# Patient Record
Sex: MALE | Race: WHITE | NOT HISPANIC OR LATINO
[De-identification: names, ages, dates, MRNs, and addresses within clinical notes are randomized per-mention and may not be internally consistent; named-entity substitution may affect disease eponyms.]

---

## 2018-07-27 ENCOUNTER — TRANSCRIPTION ENCOUNTER (OUTPATIENT)
Age: 63
End: 2018-07-27

## 2019-04-23 ENCOUNTER — OUTPATIENT (OUTPATIENT)
Dept: OUTPATIENT SERVICES | Facility: HOSPITAL | Age: 64
LOS: 1 days | End: 2019-04-23
Payer: MEDICARE

## 2019-04-23 ENCOUNTER — APPOINTMENT (OUTPATIENT)
Dept: ANESTHESIOLOGY | Facility: CLINIC | Age: 64
End: 2019-04-23

## 2019-04-23 DIAGNOSIS — M54.12 RADICULOPATHY, CERVICAL REGION: ICD-10-CM

## 2019-04-23 PROCEDURE — 62321 NJX INTERLAMINAR CRV/THRC: CPT

## 2021-11-16 ENCOUNTER — APPOINTMENT (OUTPATIENT)
Dept: PULMONOLOGY | Facility: CLINIC | Age: 66
End: 2021-11-16
Payer: MEDICARE

## 2021-11-16 VITALS
HEIGHT: 66 IN | HEART RATE: 63 BPM | TEMPERATURE: 98.2 F | WEIGHT: 195 LBS | SYSTOLIC BLOOD PRESSURE: 124 MMHG | DIASTOLIC BLOOD PRESSURE: 80 MMHG | OXYGEN SATURATION: 97 % | BODY MASS INDEX: 31.34 KG/M2

## 2021-11-16 DIAGNOSIS — Z92.29 PERSONAL HISTORY OF OTHER DRUG THERAPY: ICD-10-CM

## 2021-11-16 DIAGNOSIS — Z87.891 PERSONAL HISTORY OF NICOTINE DEPENDENCE: ICD-10-CM
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
## 2021-11-16 PROCEDURE — 99203 OFFICE O/P NEW LOW 30 MIN: CPT

## 2021-11-19 LAB — SARS-COV-2 N GENE NPH QL NAA+PROBE: NOT DETECTED

## 2021-11-23 ENCOUNTER — APPOINTMENT (OUTPATIENT)
Dept: PULMONOLOGY | Facility: CLINIC | Age: 66
End: 2021-11-23
Payer: MEDICARE

## 2021-11-23 VITALS
WEIGHT: 195 LBS | HEART RATE: 74 BPM | DIASTOLIC BLOOD PRESSURE: 82 MMHG | BODY MASS INDEX: 31.47 KG/M2 | TEMPERATURE: 97.5 F | SYSTOLIC BLOOD PRESSURE: 128 MMHG | OXYGEN SATURATION: 96 %

## 2021-11-23 PROCEDURE — 99213 OFFICE O/P EST LOW 20 MIN: CPT | Mod: 25

## 2021-11-23 PROCEDURE — 94060 EVALUATION OF WHEEZING: CPT

## 2021-11-23 PROCEDURE — 94729 DIFFUSING CAPACITY: CPT

## 2021-11-23 PROCEDURE — 94727 GAS DIL/WSHOT DETER LNG VOL: CPT

## 2021-11-26 NOTE — DISCUSSION/SUMMARY
[FreeTextEntry1] : 65 yo male with abnormal chest CT with essentially normal PFT despite known chest CT abnormalities. I reviewed the PFT results with the patient. Given lack of pulmonary complaints, there is no need for any treatment at present. A repeat chest CT will be performed in one year. He is to follow up with his PMD as before.

## 2021-11-26 NOTE — HISTORY OF PRESENT ILLNESS
[< 30 pack-years] : < 30 pack-years [Former] : former [TextBox_4] : 65 yo male presents for evaluation of abnormal chest CT findings on routine screening. The patient has a greater than 20 pack year history of smoking, having stopped five years ago but vapped after. He denies cough or SOB. 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 [TextBox_11] : 1 [TextBox_13] : 20 [YearQuit] : 2016 [TextBox_29] : Denies snoring, daytime somnolence, apneic episodes, AM headaches

## 2021-11-26 NOTE — DISCUSSION/SUMMARY
[FreeTextEntry1] : 65 yo male with prior smoking and work related dust exposure, with abnormal chest CT findings on screening study. I discussed the findings with the patient. PFT with diffusion will be performed after  covid 19 swab as mandated. A repeat chest CT will be performed in one year. He is to follow up with his PMD as before.

## 2021-11-26 NOTE — HISTORY OF PRESENT ILLNESS
[Former] : former [< 30 pack-years] : < 30 pack-years [TextBox_4] : 65 yo male with abnormal chest CT and prior smoking history, presents for follow up and PFT. The patient is "OK" without cough, chest pain or SOB.  [TextBox_11] : 1 [TextBox_13] : 20 [YearQuit] : 2016 [TextBox_29] : Denies snoring, daytime somnolence, apneic episodes, AM headaches

## 2022-11-09 ENCOUNTER — OFFICE (OUTPATIENT)
Dept: URBAN - METROPOLITAN AREA CLINIC 27 | Facility: CLINIC | Age: 67
Setting detail: OPHTHALMOLOGY
End: 2022-11-09
Payer: MEDICARE

## 2022-11-09 DIAGNOSIS — H53.40: ICD-10-CM

## 2022-11-09 DIAGNOSIS — H02.834: ICD-10-CM

## 2022-11-09 DIAGNOSIS — H02.831: ICD-10-CM

## 2022-11-09 PROBLEM — H52.7 REFRACTIVE ERROR: Status: ACTIVE | Noted: 2022-11-09

## 2022-11-09 PROBLEM — H57.813 BROW PTOSIS; BOTH EYES: Status: ACTIVE | Noted: 2022-11-09

## 2022-11-09 PROCEDURE — 92285 EXTERNAL OCULAR PHOTOGRAPHY: CPT | Performed by: OPHTHALMOLOGY

## 2022-11-09 PROCEDURE — 92081 LIMITED VISUAL FIELD XM: CPT | Performed by: OPHTHALMOLOGY

## 2022-11-09 PROCEDURE — 99214 OFFICE O/P EST MOD 30 MIN: CPT | Performed by: OPHTHALMOLOGY

## 2022-11-09 ASSESSMENT — KERATOMETRY
OS_K2POWER_DIOPTERS: 43.00
OD_K2POWER_DIOPTERS: 42.50
METHOD_AUTO_MANUAL: AUTO
OS_K1POWER_DIOPTERS: 41.25
OD_AXISANGLE_DEGREES: 014
OS_AXISANGLE_DEGREES: 173
OD_K1POWER_DIOPTERS: 41.50

## 2022-11-09 ASSESSMENT — REFRACTION_AUTOREFRACTION
OD_AXIS: 014
OS_SPHERE: -1.50
OD_SPHERE: -0.75
OS_AXIS: 170
OD_CYLINDER: +2.00
OS_CYLINDER: +2.75

## 2022-11-09 ASSESSMENT — AXIALLENGTH_DERIVED
OD_AL: 24.0544
OS_AL: 24.159

## 2022-11-09 ASSESSMENT — SPHEQUIV_DERIVED
OS_SPHEQUIV: -0.125
OD_SPHEQUIV: 0.25

## 2022-11-09 ASSESSMENT — CONFRONTATIONAL VISUAL FIELD TEST (CVF)
OD_FINDINGS: FULL
OS_FINDINGS: FULL

## 2022-11-09 ASSESSMENT — VISUAL ACUITY
OS_BCVA: 20/30-2
OD_BCVA: 20/40+2

## 2022-11-09 ASSESSMENT — LID POSITION - DERMATOCHALASIS
OD_DERMATOCHALASIS: RUL
OS_DERMATOCHALASIS: LUL

## 2022-12-20 ENCOUNTER — APPOINTMENT (OUTPATIENT)
Dept: PULMONOLOGY | Facility: CLINIC | Age: 67
End: 2022-12-20

## 2022-12-20 VITALS
TEMPERATURE: 96.4 F | OXYGEN SATURATION: 98 % | HEART RATE: 78 BPM | SYSTOLIC BLOOD PRESSURE: 124 MMHG | DIASTOLIC BLOOD PRESSURE: 68 MMHG

## 2022-12-20 DIAGNOSIS — J84.9 INTERSTITIAL PULMONARY DISEASE, UNSPECIFIED: ICD-10-CM

## 2022-12-20 PROCEDURE — 99214 OFFICE O/P EST MOD 30 MIN: CPT

## 2022-12-20 NOTE — DISCUSSION/SUMMARY
[FreeTextEntry1] : 68 yo male with significant smoking without pulmonary complaints. Cessation of all types of smoking was stressed. I reviewed the chest CT images on line and discussed the findings with the patient. A repeat study will be performed in one year. He is to follow up with his PMD as before.

## 2022-12-20 NOTE — HISTORY OF PRESENT ILLNESS
[Former] : former [>= 20 pack years] : >= 20 pack years [Current] : current [TextBox_4] : 68 yo male with long history of smoking and abnormal chest CT, presents for follow up. The patient claims to have stopped smoking two months ago after return from [facility]. He uses E cigarettes PRN however. He denies cough or SOB . A repeat chest CT was performed earlier this month. [TextBox_11] : 1 [TextBox_13] : 21 [YearQuit] : 2022 [TextBox_29] : Denies snoring, daytime somnolence, apneic episodes, AM headaches

## 2023-01-03 ENCOUNTER — APPOINTMENT (OUTPATIENT)
Dept: PULMONOLOGY | Facility: CLINIC | Age: 68
End: 2023-01-03
Payer: MEDICARE

## 2023-01-03 VITALS
RESPIRATION RATE: 18 BRPM | DIASTOLIC BLOOD PRESSURE: 80 MMHG | TEMPERATURE: 96.8 F | SYSTOLIC BLOOD PRESSURE: 134 MMHG | HEART RATE: 64 BPM | OXYGEN SATURATION: 98 %

## 2023-01-03 DIAGNOSIS — Z76.89 PERSONS ENCOUNTERING HEALTH SERVICES IN OTHER SPECIFIED CIRCUMSTANCES: ICD-10-CM

## 2023-01-03 DIAGNOSIS — R93.89 ABNORMAL FINDINGS ON DIAGNOSTIC IMAGING OF OTHER SPECIFIED BODY STRUCTURES: ICD-10-CM

## 2023-01-03 PROCEDURE — 94727 GAS DIL/WSHOT DETER LNG VOL: CPT

## 2023-01-03 PROCEDURE — 94729 DIFFUSING CAPACITY: CPT

## 2023-01-03 PROCEDURE — 99214 OFFICE O/P EST MOD 30 MIN: CPT | Mod: 25

## 2023-01-03 PROCEDURE — 94060 EVALUATION OF WHEEZING: CPT

## 2023-01-04 PROBLEM — R93.89 ABNORMAL CHEST CT: Status: ACTIVE | Noted: 2021-11-16

## 2023-01-04 PROBLEM — Z76.89 ENCOUNTER FOR EVALUATION OF COPD: Status: ACTIVE | Noted: 2023-01-04

## 2023-01-04 NOTE — HISTORY OF PRESENT ILLNESS
[Former] : former [>= 20 pack years] : >= 20 pack years [Current] : current [TextBox_4] : 68 yo male with long history of smoking and abnormal chest CT, presents for follow up and for PFT. The patient continues to abstain  from smoking for over three months. He uses E cigarettes PRN however. He denies cough or SOB . He feels "good". [TextBox_11] : 1 [TextBox_13] : 21 [YearQuit] : 2022 [TextBox_29] : Denies snoring, daytime somnolence, apneic episodes, AM headaches

## 2023-01-04 NOTE — DISCUSSION/SUMMARY
[FreeTextEntry1] : 68 yo male with stable pulmonary exam. I reviewed the PFT results with the patient as well as the chest CT results again. He is to continue to avoid cigarette use and stop E cigarette use. A repeat chest CT will be performed in one year. He is to follow up with his PMD as before.

## 2023-01-11 ENCOUNTER — OFFICE (OUTPATIENT)
Dept: URBAN - METROPOLITAN AREA CLINIC 27 | Facility: CLINIC | Age: 68
Setting detail: OPHTHALMOLOGY
End: 2023-01-11
Payer: MEDICARE

## 2023-01-11 DIAGNOSIS — H02.834: ICD-10-CM

## 2023-01-11 DIAGNOSIS — H04.123: ICD-10-CM

## 2023-01-11 DIAGNOSIS — H53.40: ICD-10-CM

## 2023-01-11 DIAGNOSIS — H04.122: ICD-10-CM

## 2023-01-11 DIAGNOSIS — H04.121: ICD-10-CM

## 2023-01-11 DIAGNOSIS — H57.813: ICD-10-CM

## 2023-01-11 DIAGNOSIS — H02.831: ICD-10-CM

## 2023-01-11 PROCEDURE — 99213 OFFICE O/P EST LOW 20 MIN: CPT | Performed by: OPHTHALMOLOGY

## 2023-01-11 PROCEDURE — 83861 MICROFLUID ANALY TEARS: CPT | Performed by: OPHTHALMOLOGY

## 2023-01-11 ASSESSMENT — AXIALLENGTH_DERIVED
OD_AL: 24.0544
OS_AL: 24.159

## 2023-01-11 ASSESSMENT — KERATOMETRY
OS_K1POWER_DIOPTERS: 41.25
OD_AXISANGLE_DEGREES: 014
OD_K2POWER_DIOPTERS: 42.50
OS_K2POWER_DIOPTERS: 43.00
OD_K1POWER_DIOPTERS: 41.50
OS_AXISANGLE_DEGREES: 173
METHOD_AUTO_MANUAL: AUTO

## 2023-01-11 ASSESSMENT — LID POSITION - DERMATOCHALASIS
OD_DERMATOCHALASIS: RUL
OS_DERMATOCHALASIS: LUL

## 2023-01-11 ASSESSMENT — REFRACTION_AUTOREFRACTION
OD_SPHERE: -0.75
OS_CYLINDER: +2.75
OS_SPHERE: -1.50
OS_AXIS: 170
OD_AXIS: 014
OD_CYLINDER: +2.00

## 2023-01-11 ASSESSMENT — SPHEQUIV_DERIVED
OS_SPHEQUIV: -0.125
OD_SPHEQUIV: 0.25

## 2023-01-11 ASSESSMENT — TEAR BREAK UP TIME (TBUT)
OS_TBUT: 1+
OD_TBUT: 1+

## 2023-01-11 ASSESSMENT — VISUAL ACUITY
OD_BCVA: 20/30-2
OS_BCVA: 20/30+2

## 2023-01-11 ASSESSMENT — CONFRONTATIONAL VISUAL FIELD TEST (CVF)
OS_FINDINGS: FULL
OD_FINDINGS: FULL

## 2023-01-20 ENCOUNTER — RX ONLY (RX ONLY)
Age: 68
End: 2023-01-20

## 2023-01-20 ENCOUNTER — OFFICE (OUTPATIENT)
Dept: URBAN - METROPOLITAN AREA CLINIC 35 | Facility: CLINIC | Age: 68
Setting detail: OPHTHALMOLOGY
End: 2023-01-20
Payer: MEDICARE

## 2023-01-20 DIAGNOSIS — H02.834: ICD-10-CM

## 2023-01-20 DIAGNOSIS — H02.831: ICD-10-CM

## 2023-01-20 PROCEDURE — 15823 BLEPHARP UPR EYELID XCSV SKN: CPT | Performed by: OPHTHALMOLOGY

## 2023-01-20 ASSESSMENT — VISUAL ACUITY
OS_BCVA: 20/30+2
OD_BCVA: 20/30-2

## 2023-01-20 ASSESSMENT — KERATOMETRY
OD_K2POWER_DIOPTERS: 42.50
OS_AXISANGLE_DEGREES: 173
OD_AXISANGLE_DEGREES: 014
OS_K2POWER_DIOPTERS: 43.00
METHOD_AUTO_MANUAL: AUTO
OS_K1POWER_DIOPTERS: 41.25
OD_K1POWER_DIOPTERS: 41.50

## 2023-01-20 ASSESSMENT — REFRACTION_AUTOREFRACTION
OD_SPHERE: -0.75
OD_CYLINDER: +2.00
OS_SPHERE: -1.50
OS_AXIS: 170
OD_AXIS: 014
OS_CYLINDER: +2.75

## 2023-01-20 ASSESSMENT — TEAR BREAK UP TIME (TBUT)
OD_TBUT: 1+
OS_TBUT: 1+

## 2023-01-20 ASSESSMENT — CONFRONTATIONAL VISUAL FIELD TEST (CVF)
OS_FINDINGS: FULL
OD_FINDINGS: FULL

## 2023-01-20 ASSESSMENT — LID POSITION - DERMATOCHALASIS
OS_DERMATOCHALASIS: LUL
OD_DERMATOCHALASIS: RUL

## 2023-01-20 ASSESSMENT — SPHEQUIV_DERIVED
OD_SPHEQUIV: 0.25
OS_SPHEQUIV: -0.125

## 2023-01-20 ASSESSMENT — AXIALLENGTH_DERIVED
OD_AL: 24.0544
OS_AL: 24.159

## 2023-01-27 ENCOUNTER — OFFICE (OUTPATIENT)
Dept: URBAN - METROPOLITAN AREA CLINIC 35 | Facility: CLINIC | Age: 68
Setting detail: OPHTHALMOLOGY
End: 2023-01-27
Payer: MEDICARE

## 2023-01-27 DIAGNOSIS — H02.831: ICD-10-CM

## 2023-01-27 DIAGNOSIS — H02.834: ICD-10-CM

## 2023-01-27 PROCEDURE — 99024 POSTOP FOLLOW-UP VISIT: CPT | Performed by: OPHTHALMOLOGY

## 2023-01-27 ASSESSMENT — LID POSITION - COMMENTS
OS_COMMENTS: INCISION INTACT
OD_COMMENTS: INCISION INTACT

## 2023-01-27 ASSESSMENT — CONFRONTATIONAL VISUAL FIELD TEST (CVF)
OD_FINDINGS: FULL
OS_FINDINGS: FULL

## 2023-01-27 ASSESSMENT — LID POSITION - DERMATOCHALASIS
OS_DERMATOCHALASIS: ABSENT
OD_DERMATOCHALASIS: ABSENT

## 2023-01-27 ASSESSMENT — AXIALLENGTH_DERIVED
OS_AL: 24.159
OD_AL: 24.0544

## 2023-01-27 ASSESSMENT — VISUAL ACUITY
OS_BCVA: 20/30+2
OD_BCVA: 20/25-1

## 2023-01-27 ASSESSMENT — REFRACTION_AUTOREFRACTION
OD_SPHERE: -0.75
OS_AXIS: 170
OD_CYLINDER: +2.00
OS_SPHERE: -1.50
OS_CYLINDER: +2.75
OD_AXIS: 014

## 2023-01-27 ASSESSMENT — KERATOMETRY
METHOD_AUTO_MANUAL: AUTO
OD_K2POWER_DIOPTERS: 42.50
OD_AXISANGLE_DEGREES: 014
OS_K2POWER_DIOPTERS: 43.00
OS_K1POWER_DIOPTERS: 41.25
OD_K1POWER_DIOPTERS: 41.50
OS_AXISANGLE_DEGREES: 173

## 2023-01-27 ASSESSMENT — TEAR BREAK UP TIME (TBUT)
OS_TBUT: 1+
OD_TBUT: 1+

## 2023-01-27 ASSESSMENT — SPHEQUIV_DERIVED
OD_SPHEQUIV: 0.25
OS_SPHEQUIV: -0.125

## 2023-02-19 ENCOUNTER — OFFICE (OUTPATIENT)
Dept: URBAN - METROPOLITAN AREA CLINIC 35 | Facility: CLINIC | Age: 68
Setting detail: OPHTHALMOLOGY
End: 2023-02-19
Payer: MEDICARE

## 2023-02-19 DIAGNOSIS — H02.831: ICD-10-CM

## 2023-02-19 DIAGNOSIS — H02.834: ICD-10-CM

## 2023-02-19 PROCEDURE — 99024 POSTOP FOLLOW-UP VISIT: CPT | Performed by: OPHTHALMOLOGY

## 2023-02-19 ASSESSMENT — VISUAL ACUITY
OD_BCVA: 20/50+2
OS_BCVA: 20/40-2

## 2023-02-19 ASSESSMENT — AXIALLENGTH_DERIVED
OS_AL: 24.159
OD_AL: 24.0544

## 2023-02-19 ASSESSMENT — KERATOMETRY
OD_K2POWER_DIOPTERS: 42.50
OD_AXISANGLE_DEGREES: 014
OS_AXISANGLE_DEGREES: 173
METHOD_AUTO_MANUAL: AUTO
OS_K2POWER_DIOPTERS: 43.00
OS_K1POWER_DIOPTERS: 41.25
OD_K1POWER_DIOPTERS: 41.50

## 2023-02-19 ASSESSMENT — CONFRONTATIONAL VISUAL FIELD TEST (CVF)
OS_FINDINGS: FULL
OD_FINDINGS: FULL

## 2023-02-19 ASSESSMENT — LID POSITION - DERMATOCHALASIS
OD_DERMATOCHALASIS: ABSENT
OS_DERMATOCHALASIS: ABSENT

## 2023-02-19 ASSESSMENT — TEAR BREAK UP TIME (TBUT)
OS_TBUT: 1+
OD_TBUT: 1+

## 2023-02-19 ASSESSMENT — REFRACTION_AUTOREFRACTION
OD_CYLINDER: +2.00
OD_AXIS: 014
OS_SPHERE: -1.50
OD_SPHERE: -0.75
OS_AXIS: 170
OS_CYLINDER: +2.75

## 2023-02-19 ASSESSMENT — LID POSITION - COMMENTS
OD_COMMENTS: INCISION INTACT
OS_COMMENTS: INCISION INTACT

## 2023-02-19 ASSESSMENT — SPHEQUIV_DERIVED
OS_SPHEQUIV: -0.125
OD_SPHEQUIV: 0.25

## 2023-11-30 ENCOUNTER — APPOINTMENT (OUTPATIENT)
Dept: PULMONOLOGY | Facility: CLINIC | Age: 68
End: 2023-11-30
Payer: MEDICARE

## 2023-11-30 VITALS
OXYGEN SATURATION: 95 % | TEMPERATURE: 98.1 F | HEART RATE: 72 BPM | DIASTOLIC BLOOD PRESSURE: 70 MMHG | WEIGHT: 183 LBS | BODY MASS INDEX: 29.54 KG/M2 | SYSTOLIC BLOOD PRESSURE: 118 MMHG

## 2023-11-30 PROCEDURE — 99214 OFFICE O/P EST MOD 30 MIN: CPT

## 2024-11-26 ENCOUNTER — APPOINTMENT (OUTPATIENT)
Dept: PULMONOLOGY | Facility: CLINIC | Age: 69
End: 2024-11-26
Payer: MEDICARE

## 2024-11-26 VITALS
TEMPERATURE: 97.3 F | WEIGHT: 176 LBS | HEART RATE: 67 BPM | HEIGHT: 66 IN | BODY MASS INDEX: 28.28 KG/M2 | SYSTOLIC BLOOD PRESSURE: 130 MMHG | OXYGEN SATURATION: 95 % | DIASTOLIC BLOOD PRESSURE: 78 MMHG

## 2024-11-26 PROCEDURE — 99406 BEHAV CHNG SMOKING 3-10 MIN: CPT

## 2024-11-26 PROCEDURE — 99214 OFFICE O/P EST MOD 30 MIN: CPT | Mod: 25
